# Patient Record
Sex: MALE | Race: BLACK OR AFRICAN AMERICAN | Employment: FULL TIME | URBAN - METROPOLITAN AREA
[De-identification: names, ages, dates, MRNs, and addresses within clinical notes are randomized per-mention and may not be internally consistent; named-entity substitution may affect disease eponyms.]

---

## 2019-03-16 ENCOUNTER — HOSPITAL ENCOUNTER (EMERGENCY)
Age: 49
Discharge: HOME | End: 2019-03-16
Attending: EMERGENCY MEDICINE
Payer: COMMERCIAL

## 2019-03-16 VITALS
HEART RATE: 91 BPM | WEIGHT: 166.56 LBS | BODY MASS INDEX: 26.77 KG/M2 | TEMPERATURE: 98.1 F | HEIGHT: 66 IN | RESPIRATION RATE: 20 BRPM | DIASTOLIC BLOOD PRESSURE: 70 MMHG | SYSTOLIC BLOOD PRESSURE: 138 MMHG | OXYGEN SATURATION: 96 %

## 2019-03-16 DIAGNOSIS — H60.391 INFECTIVE OTITIS EXTERNA OF RIGHT EAR: Primary | ICD-10-CM

## 2019-03-16 DIAGNOSIS — L02.91 ABSCESS: ICD-10-CM

## 2019-03-16 PROCEDURE — 99282 EMERGENCY DEPT VISIT SF MDM: CPT

## 2019-03-16 PROCEDURE — 6370000000 HC RX 637 (ALT 250 FOR IP): Performed by: EMERGENCY MEDICINE

## 2019-03-16 RX ORDER — ESOMEPRAZOLE MAGNESIUM 40 MG/1
40 FOR SUSPENSION ORAL 2 TIMES DAILY
COMMUNITY
End: 2020-01-08

## 2019-03-16 RX ORDER — CIPROFLOXACIN AND DEXAMETHASONE 3; 1 MG/ML; MG/ML
4 SUSPENSION/ DROPS AURICULAR (OTIC) ONCE
Status: DISCONTINUED | OUTPATIENT
Start: 2019-03-16 | End: 2019-03-16 | Stop reason: CLARIF

## 2019-03-16 RX ORDER — ESOMEPRAZOLE MAGNESIUM 40 MG/1
40 CAPSULE, DELAYED RELEASE ORAL
Qty: 30 CAPSULE | Refills: 0 | Status: SHIPPED | OUTPATIENT
Start: 2019-03-16 | End: 2020-01-27

## 2019-03-16 RX ORDER — OXYCODONE HYDROCHLORIDE AND ACETAMINOPHEN 5; 325 MG/1; MG/1
1 TABLET ORAL EVERY 4 HOURS PRN
Qty: 20 TABLET | Refills: 0 | Status: SHIPPED | OUTPATIENT
Start: 2019-03-16 | End: 2019-03-21

## 2019-03-16 RX ORDER — CIPROFLOXACIN AND DEXAMETHASONE 3; 1 MG/ML; MG/ML
4 SUSPENSION/ DROPS AURICULAR (OTIC) 2 TIMES DAILY
Qty: 1 BOTTLE | Refills: 0 | Status: SHIPPED | OUTPATIENT
Start: 2019-03-16 | End: 2019-03-23

## 2019-03-16 RX ORDER — AMOXICILLIN AND CLAVULANATE POTASSIUM 875; 125 MG/1; MG/1
1 TABLET, FILM COATED ORAL ONCE
Status: COMPLETED | OUTPATIENT
Start: 2019-03-16 | End: 2019-03-16

## 2019-03-16 RX ORDER — OXYCODONE HYDROCHLORIDE AND ACETAMINOPHEN 5; 325 MG/1; MG/1
2 TABLET ORAL ONCE
Status: COMPLETED | OUTPATIENT
Start: 2019-03-16 | End: 2019-03-16

## 2019-03-16 RX ORDER — CIPROFLOXACIN HYDROCHLORIDE 3.5 MG/ML
4 SOLUTION/ DROPS TOPICAL ONCE
Status: COMPLETED | OUTPATIENT
Start: 2019-03-16 | End: 2019-03-16

## 2019-03-16 RX ORDER — DEXAMETHASONE SODIUM PHOSPHATE 1 MG/ML
4 SOLUTION/ DROPS OPHTHALMIC ONCE
Status: COMPLETED | OUTPATIENT
Start: 2019-03-16 | End: 2019-03-16

## 2019-03-16 RX ORDER — CIPROFLOXACIN 500 MG/1
750 TABLET, FILM COATED ORAL 2 TIMES DAILY
Qty: 14 TABLET | Refills: 0 | Status: SHIPPED | OUTPATIENT
Start: 2019-03-16 | End: 2019-03-23

## 2019-03-16 RX ORDER — AMOXICILLIN AND CLAVULANATE POTASSIUM 875; 125 MG/1; MG/1
1 TABLET, FILM COATED ORAL 2 TIMES DAILY
Qty: 20 TABLET | Refills: 0 | Status: SHIPPED | OUTPATIENT
Start: 2019-03-16 | End: 2019-03-26

## 2019-03-16 RX ORDER — ONDANSETRON 4 MG/1
4 TABLET, ORALLY DISINTEGRATING ORAL ONCE
Status: COMPLETED | OUTPATIENT
Start: 2019-03-16 | End: 2019-03-16

## 2019-03-16 RX ADMIN — CIPROFLOXACIN HYDROCHLORIDE 4 DROP: 3 SOLUTION/ DROPS OPHTHALMIC at 06:39

## 2019-03-16 RX ADMIN — ONDANSETRON 4 MG: 4 TABLET, ORALLY DISINTEGRATING ORAL at 06:40

## 2019-03-16 RX ADMIN — OXYCODONE AND ACETAMINOPHEN 2 TABLET: 5; 325 TABLET ORAL at 06:37

## 2019-03-16 RX ADMIN — DEXAMETHASONE SODIUM PHOSPHATE 4 DROP: 1 SOLUTION/ DROPS OPHTHALMIC at 06:41

## 2019-03-16 RX ADMIN — AMOXICILLIN AND CLAVULANATE POTASSIUM 1 TABLET: 875; 125 TABLET, FILM COATED ORAL at 06:37

## 2019-03-16 ASSESSMENT — PAIN SCALES - GENERAL: PAINLEVEL_OUTOF10: 8

## 2020-01-08 ENCOUNTER — HOSPITAL ENCOUNTER (EMERGENCY)
Age: 50
Discharge: HOME | End: 2020-01-08
Attending: EMERGENCY MEDICINE
Payer: COMMERCIAL

## 2020-01-08 VITALS
WEIGHT: 164.2 LBS | HEIGHT: 66 IN | BODY MASS INDEX: 26.39 KG/M2 | RESPIRATION RATE: 16 BRPM | DIASTOLIC BLOOD PRESSURE: 88 MMHG | SYSTOLIC BLOOD PRESSURE: 166 MMHG | TEMPERATURE: 98.1 F | HEART RATE: 84 BPM | OXYGEN SATURATION: 100 %

## 2020-01-08 LAB
DIRECT EXAM: NORMAL
Lab: NORMAL
SPECIMEN DESCRIPTION: NORMAL

## 2020-01-08 PROCEDURE — 99283 EMERGENCY DEPT VISIT LOW MDM: CPT

## 2020-01-08 PROCEDURE — 87804 INFLUENZA ASSAY W/OPTIC: CPT

## 2020-01-08 RX ORDER — FAMOTIDINE 20 MG/1
20 TABLET, FILM COATED ORAL 2 TIMES DAILY
Qty: 20 TABLET | Refills: 0 | Status: SHIPPED | OUTPATIENT
Start: 2020-01-08 | End: 2020-01-27

## 2020-01-08 RX ORDER — CETIRIZINE HYDROCHLORIDE 10 MG/1
10 TABLET ORAL DAILY
Qty: 14 TABLET | Refills: 0 | Status: SHIPPED | OUTPATIENT
Start: 2020-01-08 | End: 2020-01-22

## 2020-01-08 RX ORDER — FLUTICASONE PROPIONATE 50 MCG
1 SPRAY, SUSPENSION (ML) NASAL DAILY
Qty: 1 BOTTLE | Refills: 0 | Status: SHIPPED | OUTPATIENT
Start: 2020-01-08 | End: 2020-01-27

## 2020-01-08 RX ORDER — BENZONATATE 100 MG/1
100-200 CAPSULE ORAL 3 TIMES DAILY PRN
Qty: 60 CAPSULE | Refills: 0 | Status: SHIPPED | OUTPATIENT
Start: 2020-01-08 | End: 2020-01-15

## 2020-01-08 ASSESSMENT — ENCOUNTER SYMPTOMS
SHORTNESS OF BREATH: 0
VOMITING: 0
COLOR CHANGE: 0
NAUSEA: 0
DIARRHEA: 0
SORE THROAT: 0
ABDOMINAL PAIN: 0
SINUS PRESSURE: 1
RHINORRHEA: 1
COUGH: 0

## 2020-01-08 ASSESSMENT — PAIN SCALES - GENERAL: PAINLEVEL_OUTOF10: 10

## 2020-01-08 NOTE — ED NOTES
PT to ED with heart burn and ear ache with sinus congestion respirations are non labored and radial pulses are strong bilat.      Sae Thompson RN  01/08/20 4608

## 2020-01-08 NOTE — DISCHARGE INSTR - COC
the diagnosis listed and that he requires {Admit to Appropriate Level of Care:81870} for {GREATER/LESS:962390359} 30 days.      Update Admission H&P: {CHP DME Changes in [facility]:298705943}    PHYSICIAN SIGNATURE:  {Esignature:[id number]}

## 2020-01-08 NOTE — ED PROVIDER NOTES
Respiratory: Negative for cough and shortness of breath. Cardiovascular: Negative for chest pain and palpitations. Gastrointestinal: Negative for abdominal pain, diarrhea, nausea and vomiting. Musculoskeletal: Negative for arthralgias, myalgias, neck pain and neck stiffness. Skin: Negative for color change and rash. Neurological: Negative for dizziness, weakness, light-headedness and headaches. Except as noted above the remainder of the review of systems was reviewed and negative. PHYSICAL EXAM    (up to 7 for level 4, 8 or more for level 5)     ED Triage Vitals   BP Temp Temp src Pulse Resp SpO2 Height Weight   01/08/20 1337 01/08/20 1337 -- 01/08/20 1337 01/08/20 1337 01/08/20 1337 01/08/20 1337 01/08/20 1337   (!) 176/109 98.1 °F (36.7 °C)  91 18 97 % 5' 6\" (1.676 m) 164 lb 3.2 oz (74.5 kg)     Physical Exam  Vitals signs reviewed. Constitutional:       General: He is not in acute distress. Appearance: He is well-developed. He is not diaphoretic. HENT:      Right Ear: Tympanic membrane, ear canal and external ear normal.      Left Ear: Tympanic membrane, ear canal and external ear normal.      Nose: Congestion present. No rhinorrhea. Mouth/Throat:      Mouth: Mucous membranes are moist.      Pharynx: Oropharynx is clear. Eyes:      Conjunctiva/sclera: Conjunctivae normal.   Neck:      Musculoskeletal: Neck supple. No neck rigidity. Cardiovascular:      Rate and Rhythm: Normal rate and regular rhythm. Heart sounds: Normal heart sounds. Pulmonary:      Effort: Pulmonary effort is normal. No respiratory distress. Breath sounds: Normal breath sounds. No wheezing or rales. Abdominal:      General: There is no distension. Palpations: Abdomen is soft. Tenderness: There is no tenderness. Skin:     General: Skin is warm and dry. Findings: No rash. Neurological:      Mental Status: He is alert and oriented to person, place, and time.    Psychiatric:

## 2020-01-27 ENCOUNTER — HOSPITAL ENCOUNTER (EMERGENCY)
Age: 50
Discharge: HOME | End: 2020-01-27
Attending: EMERGENCY MEDICINE
Payer: COMMERCIAL

## 2020-01-27 ENCOUNTER — APPOINTMENT (OUTPATIENT)
Dept: GENERAL RADIOLOGY | Age: 50
End: 2020-01-27
Payer: COMMERCIAL

## 2020-01-27 VITALS
OXYGEN SATURATION: 95 % | HEART RATE: 100 BPM | SYSTOLIC BLOOD PRESSURE: 152 MMHG | BODY MASS INDEX: 26.57 KG/M2 | RESPIRATION RATE: 14 BRPM | HEIGHT: 66 IN | WEIGHT: 165.31 LBS | TEMPERATURE: 98.1 F | DIASTOLIC BLOOD PRESSURE: 87 MMHG

## 2020-01-27 PROCEDURE — 73610 X-RAY EXAM OF ANKLE: CPT

## 2020-01-27 PROCEDURE — 29515 APPLICATION SHORT LEG SPLINT: CPT

## 2020-01-27 PROCEDURE — 99284 EMERGENCY DEPT VISIT MOD MDM: CPT

## 2020-01-27 RX ORDER — HYDROCODONE BITARTRATE AND ACETAMINOPHEN 5; 325 MG/1; MG/1
1 TABLET ORAL EVERY 8 HOURS PRN
Qty: 20 TABLET | Refills: 0 | Status: SHIPPED | OUTPATIENT
Start: 2020-01-27 | End: 2020-02-03

## 2020-01-27 RX ORDER — IBUPROFEN 800 MG/1
800 TABLET ORAL EVERY 8 HOURS PRN
Qty: 15 TABLET | Refills: 0 | Status: SHIPPED | OUTPATIENT
Start: 2020-01-27

## 2020-01-27 ASSESSMENT — ENCOUNTER SYMPTOMS: COLOR CHANGE: 0

## 2020-01-27 ASSESSMENT — PAIN SCALES - GENERAL: PAINLEVEL_OUTOF10: 10

## 2020-01-27 ASSESSMENT — PAIN DESCRIPTION - PAIN TYPE: TYPE: ACUTE PAIN

## 2020-01-27 NOTE — ED NOTES
Pt presents to ED via private auto with c/o ankle pain s/p fall. Pt states that he fell last night and injured his right ankle. Pt has swelling noted to the lateral aspect of his right ankle. Pt ambulated to room 7 with slight difficulty d/t pain, pt refused wheelchair. Ice pack applied to pt's right ankle. Pt's bilat pedal pulses are strong and palpable. Pt's mucous membranes are pink and moist, pt's skin is warm and dry. Pt's respirations are even and non-labored, no distress noted at this time, will continue to monitor pt.       Florida Jones RN  01/27/20 3571

## 2020-01-27 NOTE — LETTER
[facility] ED  [address] [address]  Phone: [phone]             January 27, 2020    Patient: Elvia Montoya   YOB: 1970   Date of Visit: 1/27/2020       To Whom It May Concern:    Elvia Montoya was seen and treated in our emergency department on 1/27/2020. Please excuse him from work 1- through 1-.     Sincerely,             Signature:__________________________________

## 2020-01-28 ENCOUNTER — OFFICE VISIT (OUTPATIENT)
Dept: PODIATRY | Age: 50
End: 2020-01-28
Payer: COMMERCIAL

## 2020-01-28 VITALS — BODY MASS INDEX: 26.52 KG/M2 | WEIGHT: 165 LBS | HEIGHT: 66 IN

## 2020-01-28 PROCEDURE — 99203 OFFICE O/P NEW LOW 30 MIN: CPT | Performed by: PODIATRIST

## 2020-01-28 NOTE — LETTER
[facility]  [address] [facility] [address].  Phone: [phone]    Merissa Carver        January 28, 2020     Patient: Joie Rodriguez   YOB: 1970   Date of Visit: 1/28/2020       To Whom it May Concern:    Joie Rodriguez was seen in my clinic on 1/28/2020. He may return to work on Tuesday February 11 2020. Patient is unable to work due to a left ankle fracture. He has to wear the cam boot, that was given to him in the office, at all times as well as using the crutches to remain non-weight bearing on his left ankle. Patient will follow up in my office in 2 weeks for a follow up appointment to re-examine his left ankle injury. If I feel he has not healed at that time, I will re-issue another letter for him to remain off of work. If you have any questions or concerns, please don't hesitate to call.     Sincerely,         Live Miranda DPM

## 2020-01-28 NOTE — PROGRESS NOTES
[address] [facility] [address] [facility] [address].  Dept: [phone]    NEW PATIENT PROGRESS NOTE  Date of patient's visit: 1/28/2020  Patient's Name:  Isaak Beck YOB: 1970            No care team member to display        Chief Complaint   Patient presents with    New Patient    Ankle Injury     left         HPI:   Isaak Beck is a 52 y.o. male who presents to the office today complaining of left ankle injury. Symptoms began 2 day(s) ago. Patient relates pain is Present. Pain is rated 10 out of 10 and is described as constant. Treatments prior to today's visit include: visit to [facility] emergency room/x-rays . Currently denies F/C/N/V. Pt's primary care physician is No primary care provider on file. last seen patient states he does not have a pcp at this time. patient states he tried to step up in his truck and lost his balance and fell back on his left ankle. No Known Allergies    No past medical history on file. Prior to Admission medications    Medication Sig Start Date End Date Taking? Authorizing Provider   ibuprofen (ADVIL;MOTRIN) 800 MG tablet Take 1 tablet by mouth every 8 hours as needed for Pain or Fever 1/27/20  Yes JACQUELINE Iyer CNP   HYDROcodone-acetaminophen (NORCO) 5-325 MG per tablet Take 1 tablet by mouth every 8 hours as needed for Pain (WARNING:  May cause drowsiness.  May impair ability to operate vehicles or machinery.  Do not use in combination with alcohol.) for up to 7 days. 1/27/20 2/3/20 Yes JACQUELINE Iyer CNP       No past surgical history on file. No family history on file.     Social History     Tobacco Use    Smoking status: Current Some Day Smoker    Smokeless tobacco: Current User   Substance Use Topics    Alcohol use: Yes       Review of Systems    Review of Systems:   History obtained from chart review and the patient  General ROS: negative for - chills, fatigue, fever, night sweats or weight gain  Constitutional: Negative for chills, diaphoresis, fatigue, fever and unexpected weight change. Musculoskeletal: Positive for arthralgias, gait problem and joint swelling. Neurological ROS: negative for - behavioral changes, confusion, headaches or seizures. Negative for weakness and numbness. Dermatological ROS: negative for - mole changes, rash  Cardiovascular: Negative for leg swelling. Gastrointestinal: Negative for constipation, diarrhea, nausea and vomiting. Lower Extremity Physical Examination:   Vitals: There were no vitals filed for this visit. General: AAO x 3 in NAD. Dermatologic Exam:  Skin lesion/ulceration Absent . Skin No rashes or nodules noted. .       Musculoskeletal:     1st MPJ ROM decreased, Bilateral.  Muscle strength 5/5, Bilateral.  Pain present upon palpation of left ankle along ATFL and CFL. +POP along peroneal tendons. Medial longitudinal arch, Bilateral WNL. Ankle ROM WNL,Bilateral.    Dorsally contracted digits absent digits 1-5 Bilateral.     Vascular: DP and PT pulses palpable 2/4, Bilateral.  CFT <3 seconds, Bilateral.  Hair growth present to the level of the digits, Bilateral.  Edema noted to lateral left ankle. Varicosities absent, Bilateral. Erythema absent, Bilateral    Neurological: Sensation intact to light touch to level of digits, Bilateral.  Protective sensation intact 10/10 sites via 5.07/10g [place]-Rebecca Monofilament, Bilateral.  negative Tinel's, Bilateral.  negative Valleix sign, Bilateral.      Integument: Warm, dry, supple, Bilateral.  Open lesion absent, Bilateral.  Interdigital maceration absent to web spaces 1-4, Bilateral.  Nails are normal in length, thickness and color 1-5 bilateral.  Fissures absent, Bilateral.       Asessment: Patient is a 52 y.o. male with:    Diagnosis Orders   1. Sprain of right ankle, unspecified ligament, initial encounter     2. Edema of lower extremity     3.  Difficulty walking Plan: Patient examined and evaluated. Current condition and treatment options discussed in detail. Discussed conservative and surgical options with the patient. Advised pt to immobilize left ankle using CAM boot. Reviewed xrays with patient, without signs of acute fractures. Pt to ice and elevate at home. May take NSAID as tolerated. Verbal and written instructions given to patient. Contact office with any questions/problems/concerns. RTC in 2week(s).     1/28/2020    Electronically signed by Rosana Little DPM on 1/28/2020 at 3:21 PM  1/28/2020

## 2020-02-10 ENCOUNTER — OFFICE VISIT (OUTPATIENT)
Dept: PODIATRY | Age: 50
End: 2020-02-10
Payer: COMMERCIAL

## 2020-02-10 VITALS — BODY MASS INDEX: 26.52 KG/M2 | WEIGHT: 165 LBS | RESPIRATION RATE: 16 BRPM | HEIGHT: 66 IN

## 2020-02-10 PROCEDURE — 99213 OFFICE O/P EST LOW 20 MIN: CPT | Performed by: PODIATRIST

## 2020-02-10 NOTE — PROGRESS NOTES
[facility] PHYSICIANS  [facility]  [address] [place] [address]  Dept: [phone]  Dept Fax: [phone]    RETURN PATIENT PROGRESS NOTE  Date of patient's visit: 2/10/2020  Patient's Name:  Jared Fischer YOB: 1970            No care team member to display       Jared Fischer 52 y.o. male that presents for follow-up of   Chief Complaint   Patient presents with    Ankle Pain     right ankle     Symptoms began 2 week(s) ago and are increased . Patient relates pain is Present. Pain is rated 8 out of 10 and is described as constant, severe. Treatments prior to today's visit include: CAM boot. Currently denies F/C/N/V. No Known Allergies    No past medical history on file. Prior to Admission medications    Medication Sig Start Date End Date Taking? Authorizing Provider   ibuprofen (ADVIL;MOTRIN) 800 MG tablet Take 1 tablet by mouth every 8 hours as needed for Pain or Fever 1/27/20  Yes Donovan Diaz, APRN - CNP       Review of Systems    Review of Systems:  History obtained from chart review and the patient  General ROS: negative for - chills, fatigue, fever, night sweats or weight gain  Constitutional: Negative for chills, diaphoresis, fatigue, fever and unexpected weight change. Musculoskeletal: Positive for arthralgias, gait problem and joint swelling. Neurological ROS: negative for - behavioral changes, confusion, headaches or seizures. Negative for weakness and numbness. Dermatological ROS: negative for - mole changes, rash  Cardiovascular: Negative for leg swelling. Gastrointestinal: Negative for constipation, diarrhea, nausea and vomiting. Lower Extremity Physical Examination:     Vitals:   Vitals:    02/10/20 1452   Resp: 16     General: AAO x 3 in NAD. Dermatologic Exam:  Skin lesion/ulceration Absent . Skin No rashes or nodules noted. .       Musculoskeletal:     1st MPJ ROM decreased, Bilateral.  Muscle strength 5/5, Bilateral.  Pain present upon palpation of right ankle along CFL. Medial longitudinal arch, Bilateral WNL. Ankle ROM guarded, right. Dorsally contracted digits absent digits 1-5 Bilateral.     Vascular: DP and PT pulses palpable 2/4, Bilateral.  CFT <3 seconds, Bilateral.  Hair growth present to the level of the digits, Bilateral.  Edema absent, Bilateral.  Varicosities absent, Bilateral. Erythema absent, Bilateral    Neurological: Sensation intact to light touch to level of digits, Bilateral.  Protective sensation intact 10/10 sites via 5.07/10g Dahlgren-Rebecca Monofilament, Bilateral.  negative Tinel's, Bilateral.  negative Valleix sign, Bilateral.      Integument: Warm, dry, supple, Bilateral.  Open lesion absent, Bilateral.  Interdigital maceration absent to web spaces 1-4, Bilateral.  Nails are normal in length, thickness and color 1-5 bilateral.  Fissures absent, Bilateral.       Asessment: Patient is a 52 y.o. male with:    Diagnosis Orders   1. Sprain of right ankle, unspecified ligament, initial encounter     2. Difficulty walking         Plan: Patient examined and evaluated. Current condition and treatment options discussed in detail. Advised pt to remain in CAM boot to immobilize right ankle. At this time we recommend pt limit weightbearing for 2 weeks for adequate healing. We do not recommend surgery at this time. Verbal and written instructions given to patient. Contact office with any questions/problems/concerns. No orders of the defined types were placed in this encounter. No orders of the defined types were placed in this encounter. RTC in 2week(s).     2/10/2020      Electronically signed by Jan Bustamante DPM on 2/10/2020 at 2:53 PM  2/10/2020

## 2020-02-19 ENCOUNTER — OFFICE VISIT (OUTPATIENT)
Dept: PODIATRY | Age: 50
End: 2020-02-19
Payer: COMMERCIAL

## 2020-02-19 VITALS — WEIGHT: 165 LBS | BODY MASS INDEX: 26.52 KG/M2 | RESPIRATION RATE: 16 BRPM | HEIGHT: 66 IN

## 2020-02-19 PROCEDURE — 99213 OFFICE O/P EST LOW 20 MIN: CPT | Performed by: PODIATRIST

## 2020-02-19 NOTE — PROGRESS NOTES
[facility] PHYSICIANS  [facility]  [address] [place] [address]  Dept: [phone]  Dept Fax: [phone]    RETURN PATIENT PROGRESS NOTE  Date of patient's visit: 2/19/2020  Patient's Name:  Sincere Oreilly YOB: 1970            No care team member to display       Sincere Oreilly 52 y.o. male that presents for follow-up of right ankle pain  Chief Complaint   Patient presents with    Ankle Pain     right     Pt's primary care physician is No primary care provider on file. last seen  Symptoms began 6 week(s) ago and are decreased . Patient relates pain is Present. Pain is rated 7 out of 10 and is described as intermittent. Treatments prior to today's visit include: wearing CAM boot. Currently denies F/C/N/V. No Known Allergies    No past medical history on file. Prior to Admission medications    Medication Sig Start Date End Date Taking? Authorizing Provider   ibuprofen (ADVIL;MOTRIN) 800 MG tablet Take 1 tablet by mouth every 8 hours as needed for Pain or Fever 1/27/20   JACQUELINE Sexton - CNP       Review of Systems    Review of Systems:  History obtained from chart review and the patient  General ROS: negative for - chills, fatigue, fever, night sweats or weight gain  Constitutional: Negative for chills, diaphoresis, fatigue, fever and unexpected weight change. Musculoskeletal: Positive for arthralgias, gait problem and joint swelling. Neurological ROS: negative for - behavioral changes, confusion, headaches or seizures. Negative for weakness and numbness. Dermatological ROS: negative for - mole changes, rash  Cardiovascular: Negative for leg swelling. Gastrointestinal: Negative for constipation, diarrhea, nausea and vomiting. Lower Extremity Physical Examination:     Vitals:   Vitals:    02/19/20 1315   Resp: 16     General: AAO x 3 in NAD. Dermatologic Exam:  Skin lesion/ulceration Absent . Skin No rashes or nodules noted. Kacie Herron Musculoskeletal:     1st MPJ ROM decreased, Bilateral.  Muscle strength 5/5, Bilateral.  Pain present upon palpation of right ankle. Medial longitudinal arch, Bilateral WNL. Ankle ROM guarded, right. Dorsally contracted digits absent digits 1-5 Bilateral.     Vascular: DP and PT pulses palpable 2/4, Bilateral.  CFT <3 seconds, Bilateral.  Hair growth present to the level of the digits, Bilateral.  Edema absent, Bilateral.  Varicosities absent, Bilateral. Erythema absent, Bilateral    Neurological: Sensation intact to light touch to level of digits, Bilateral.  Protective sensation intact 10/10 sites via 5.07/10g Kane-Rebecca Monofilament, Bilateral.  negative Tinel's, Bilateral.  negative Valleix sign, Bilateral.      Integument: Warm, dry, supple, Bilateral.  Open lesion absent, Bilateral.  Interdigital maceration absent to web spaces 1-4, Bilateral.  Nails are normal in length, thickness and color 1-5 bilateral.  Fissures absent, Bilateral.       Asessment: Patient is a 52 y.o. male with:    Diagnosis Orders   1. Sprain of right ankle, unspecified ligament, initial encounter  Ambulatory referral to Physical Therapy   2. Difficulty walking  Ambulatory referral to Physical Therapy       Plan: Patient examined and evaluated. Current condition and treatment options discussed in detail. Advised pt to gradually transition to normal shoes as tolerated. We do not advised staying in CAM boot too long as it can cause increased weakness to RLE. Recommend physical therapy at this time to improve ROM. Due to absent fracture on xrays, pt may return to weightbearing as tolerated. Verbal and written instructions given to patient. Contact office with any questions/problems/concerns. No orders of the defined types were placed in this encounter. No orders of the defined types were placed in this encounter. RTC in 2month(s).     2/19/2020      Electronically signed by Iban Martínez DPM on 2/19/2020 at 1:54 PM  2/19/2020

## 2020-02-19 NOTE — LETTER
[facility]  [address] [facility] [address]  Phone: [phone]  Fax: [phone]    Merissa Palmer        February 19, 2020     Patient: Mac Mar   YOB: 1970   Date of Visit: 2/19/2020       To Whom It May Concern: It is my medical opinion that Mac Mar should remain out of work until 03/03/2020 and will be starting physical therapy asap which could take up to 6-8 weeks. If you have any questions or concerns, please don't hesitate to call.     Sincerely,        Kevin Flowers DPM

## 2020-03-09 ENCOUNTER — OFFICE VISIT (OUTPATIENT)
Dept: PODIATRY | Age: 50
End: 2020-03-09
Payer: COMMERCIAL

## 2020-03-09 VITALS — RESPIRATION RATE: 16 BRPM | WEIGHT: 165 LBS | BODY MASS INDEX: 26.52 KG/M2 | HEIGHT: 66 IN

## 2020-03-09 PROCEDURE — 99213 OFFICE O/P EST LOW 20 MIN: CPT | Performed by: PODIATRIST

## 2020-03-09 NOTE — PROGRESS NOTES
[facility] PHYSICIANS  [facility]  [address] [place] [address]  Dept: [phone]  Dept Fax: [phone]    RETURN PATIENT PROGRESS NOTE  Date of patient's visit: 3/9/2020  Patient's Name:  Kenn Sims YOB: 1970            No care team member to display       Kenn Sims 52 y.o. male that presents for follow-up of right ankle pain  Chief Complaint   Patient presents with    Ankle Pain     right     Pt's primary care physician is No primary care provider on file. last seen  Symptoms began 8 week(s) ago and are decreased . Patient relates pain is Present. Pain is rated 5 out of 10 and is described as intermittent. Treatments prior to today's visit include: Pt was initially seen  At [facility] were x-rays were obtained and no fracture was seen. Pt was given a CAM boot  Currently denies F/C/N/V. No Known Allergies    No past medical history on file. Prior to Admission medications    Medication Sig Start Date End Date Taking? Authorizing Provider   ibuprofen (ADVIL;MOTRIN) 800 MG tablet Take 1 tablet by mouth every 8 hours as needed for Pain or Fever 1/27/20   JACQUELINE Vázquez - CNP       Review of Systems    Review of Systems:  History obtained from chart review and the patient  General ROS: negative for - chills, fatigue, fever, night sweats or weight gain  Constitutional: Negative for chills, diaphoresis, fatigue, fever and unexpected weight change. Musculoskeletal: Positive for arthralgias, gait problem and joint swelling. Neurological ROS: negative for - behavioral changes, confusion, headaches or seizures. Negative for weakness and numbness. Dermatological ROS: negative for - mole changes, rash  Cardiovascular: Negative for leg swelling. Gastrointestinal: Negative for constipation, diarrhea, nausea and vomiting.          Lower Extremity Physical Examination:     Vitals:   Vitals:    03/09/20 1334   Resp: 16     General: AAO x 3 in PM  3/9/2020

## 2020-03-10 ENCOUNTER — HOSPITAL ENCOUNTER (OUTPATIENT)
Dept: PHYSICAL THERAPY | Age: 50
Setting detail: THERAPIES SERIES
Discharge: HOME | End: 2020-03-10
Payer: COMMERCIAL

## 2020-03-10 PROCEDURE — 97110 THERAPEUTIC EXERCISES: CPT

## 2020-03-10 PROCEDURE — 97162 PT EVAL MOD COMPLEX 30 MIN: CPT

## 2020-03-10 PROCEDURE — 97016 VASOPNEUMATIC DEVICE THERAPY: CPT

## 2020-03-10 NOTE — CONSULTS
artifact.  [] MRI:  [] Other:    Medications: [x] Refer to full medical record [] None [] Other:  Allergies:      [x] Refer to full medical record [] None [] Other:    Function:  Hand Dominance  [] Right  [x] Left  In what type of home []  One story   [x] Two story   [] Split level   Number of stairs to enter 5   With handrail on the [x]  Right to enter   [] Left to enter   Bathroom has a [x]  Tub only -- struggles to get out of the tub, putting pressure on the ankle hurts  [] Tub/shower combo   [] Walk in shower    []  Grab bars   Washing machine is on []  Main level   [] Second level   [x] Basement   Employer [employer]   Job Status []  Normal duty   [] Light duty   [] Off due to condition    []  Retired   [] Not employed   [] Disability  [] Other:  [x]  Return to work:  Was cleared to return to work as of 3/9/20   Work activities/duties Putting fay on [employer]     ADL/IADL Previous level of function Current level of function Who currently assists the patient with task   Bathing  [x] Independent  [] Assist [x] Independent  [] Assist    Dress/grooming [x] Independent  [] Assist [x] Independent  [] Assist    Transfer/mobility [x] Independent  [] Assist [x] Independent  [] Assist    Feeding [x] Independent  [] Assist [x] Independent  [] Assist    Toileting [x] Independent  [] Assist [x] Independent  [] Assist    Driving [x] Independent  [] Assist [x] Independent  [] Assist    Housekeeping [x] Independent  [] Assist [x] Independent  [] Assist    Grocery shop/meal prep [x] Independent  [] Assist [x] Independent  [] Assist      Gait Prior level of function Current level of function    [x] Independent  [] Assist [x] Independent  [] Assist   Device: [x] Independent [x] Independent    [] Straight Cane [] Quad cane [] Straight Cane [] Quad cane    [] Standard walker [] Rolling walker   [] 4 wheeled walker [] Standard walker [] Rolling walker   [] 4 wheeled walker    [] Wheelchair [] Wheelchair     Pain:  [x] Yes  [] No Location: Right ankle   Pain Rating: (0-10 scale) 9/10  Pain altered Tx:  [] Yes  [x] No  Action:    Symptoms:  [] Improving [] Worsening [x] Same  Better:  [] AM    [] PM    [x] Sit, but not too long    [] Rise/Sit    []Stand    [] Walk    [] Lying    [] Other:  Worse: [] AM    [] PM    [] Sit    [] Rise/Sit    []Stand    [] Walk    [] Lying    [] Bend                      [] Valsalva    [x] Other: a lot  Sleep: [] OK    [x] Disturbed    Objective:    ROM  ° A/P STRENGTH    Left Right Left Right   Ankle DF 8 AROM Lack 5 to AROM 3 5 3+   PF 58 35-38 5 3+   INV 52 24-34 5 3+   EVER 20 10 5 3+   Hamstring Lack 20 Lack 12     DF with knee flexion 3 right, 11 left   Non- effected Initial, in cm Re-eval,in cm Comments   Supramalleolar 19.8 19.9     Arch 23.7 22.8     Figure of 8 52.0 51.5       OBSERVATION No Deficit Deficit Not Tested Comments   Posture       Forward Head [x] [] []    Rounded Shoulders [] [x] []    Kyphosis [x] [] []    Lordosis [x] [] []    Genu Valgus [x] [] []    Genu Varus [x] [] []    Genu Recurvatum [x] [] []    Pronation [x] [] []    Supination [x] [] []    Leg Length Discrp [x] [] []    Slumped Sitting [] [x] []    Palpation [] [x] [] Tender entire ankle   Sensation [] [x] [] Toes and top of foot get numb   Edema [] [x] [] See chart above   Neurological [x] [] []    Gait [] [] [] Analysis: Did not analyze at eval due to patient only wearing CAM boot     FUNCTION Normal Difficult Unable   Sitting [] [x] []   Standing [] [x] []   Ambulation [] [x] []   Groom/Dress [] [x] []   Lift/Carry [] [x] []   Stairs [] [x] []   Bending [] [x] []   Squat [] [x] []   Kneel [] [x] []     Functional Test: FAAM Score: 69% functionally impaired     Comments:    Assessment:  Patient would benefit from skilled physical therapy services in order to: improve gait, improve range of motion, improve strength, decrease risk of falling, decrease edema, improve ability to return to work.     Patient insisted that therapist tell physician that he is not ready to return to work. Patient reports he has not been weaning out of his boot, as his ankle hurts more when his boot is off9. Education provided to patient that it is because he continues to wear the boot that his pain continues to be elevated. He states that pain continues to limit him and he cannot stand to have shoes on for extended time. He states he feels like he needs the compression of his boot to walk. Discussed continued icing of ankle, and that it will swell with activity for months, and that it is normal.  Due to patients lack of progression out of the boot at home, his recovery has been delayed. Educated patient that he needs to bring his shoes to next Rx (either tennis shoe or steel toed work boots). Will work with patient three times per week to expedite his return to work. Problems:    [x] ? Pain:  [x] ? ROM:  [x] ? Strength:  [x] ? Function:  [] Other:       STG: (to be met in 9 treatments)  1. ? Pain: Right ankle pain improve to 6/10 with standing and walking with tennis shoe on  2. ? ROM: Right ankle AROM improve to 0 degrees dorsiflexion, 44 degrees plantarflexion, inversion 34 degrees, eversion 15 degrees  3. ? Strength: Grossly 4-/5 all planes  4. ? Function: Patient able to walk with work boot or tennis shoe on community distances  5. Patient to be independent with home exercise program as demonstrated by performance with correct form without cues. LTG: (to be met in 18 treatments)  1. Return to work full duty. 2. Right ankle pain improve to 2/10 with long distance walking  3. Right ankle AROM improve to 5 degrees dorsiflexion, 50 degrees plantarflexion, inversion 40 degrees  4. Patient able to complete 5 reps single leg heel raise without UE assist with boot/shoe on with right foot  5. Sleep to return to baseline  6. FAAM score of 25% functionally impaired or less                 Patient goals:  \"To get better and move forward with my life\"    Rehab Potential:  [x] Good  [] Fair  [] Poor   Suggested Professional Referral:  [x] No  [] Yes:  Barriers to Goal Achievement:  [] No  [x] Yes: Patient very hesitant to move right ankle; continues to insist that he cannot return to work as he cannot wear a steel toed boot yet. Domestic Concerns:  [x] No  [] Yes:    Pt. Education:  [x] Plans/Goals, Risks/Benefits discussed  [x] Home exercise program    Method of Education: [x] Verbal  [x] Demo  [x] Written -- see chart below  Comprehension of Education:  [] Verbalizes understanding. [] Demonstrates understanding. [x] Needs Review. [] Demonstrates/verbalizes understanding of HEP/Ed previously given. Treatment Plan:  [x] Therapeutic Exercise   29937  [] Iontophoresis: 4 mg/mL Dexamethasone Sodium Phosphate  mAmin  96956   [x] Therapeutic Activity  44549 [x] Vasopneumatic cold with compression  09299    [x] Gait Training   90553 [] Ultrasound   54420   [x] Neuromuscular Re-education  11460 [] Electrical Stimulation Unattended  54534   [x] Manual Therapy  76424 [x] Electrical Stimulation Attended  33868   [x] Instruction in HEP  [] Lumbar/Cervical Traction  01157   [] Aquatic Therapy   16373 [] Cold/hotpack    [] Massage   20572      [] Dry Needling, 1 or 2 muscles  67688   [] Biofeedback, first 15 minutes   67938  [] Biofeedback, additional 15 minutes   19056 [] Dry Needling, 3 or more muscles  80145     []  Medication allergies reviewed for use of    Dexamethasone Sodium Phosphate 4mg/ml     with iontophoresis treatments. Pt is not allergic. Frequency:  3 x/week for 18 visits    Todays Treatment:  Modalities: Vasopneumatic cold/compression after exercises with right foot on bolster; 15 minutes; low pressure, 42 degrees.   (Sanitary sock placed in cupboard by estim patches)  Precautions:  Exercises:  Exercise Reps/ Time Weight/ Level Comments   Seated towel scrunches 15 x  HEP   Seated DF 15 x  HEP   Seated PF 15 x  HEP   Stand sway forward/

## 2020-03-12 ENCOUNTER — HOSPITAL ENCOUNTER (OUTPATIENT)
Dept: PHYSICAL THERAPY | Age: 50
Setting detail: THERAPIES SERIES
Discharge: HOME | End: 2020-03-12
Payer: COMMERCIAL

## 2020-03-12 PROCEDURE — 97110 THERAPEUTIC EXERCISES: CPT

## 2020-03-13 ENCOUNTER — HOSPITAL ENCOUNTER (OUTPATIENT)
Dept: PHYSICAL THERAPY | Age: 50
Setting detail: THERAPIES SERIES
Discharge: HOME | End: 2020-03-13
Payer: COMMERCIAL

## 2020-03-13 PROCEDURE — 97110 THERAPEUTIC EXERCISES: CPT

## 2020-03-13 NOTE — FLOWSHEET NOTE
Exercise 38 3   []  Manual Therapy     []  Ther Activities     []  Aquatics     []  Vasocompression     []  Other     Total Treatment time 38 3       Assessment: [x] Progressing toward goals. [] No change. [x] Other: Pt accomplished all activities as listed above with noted increase in pain through out but pt completed all activities. Pt noted that the pain increase was a good pain like he was working. Added SLS, ankle ABC, 4 way ankle and marbles to increase lower extremity strength. Pt presented with good tolerance for progressions. Pt received HEP for 4 way ankle and lime resistance band. [x] Patient would continue to benefit from skilled physical therapy services in order to: improve gait, improve range of motion, improve strength, decrease risk of falling, decrease edema, improve ability to return to work. STG: (to be met in 9 treatments)  1. ? Pain: Right ankle pain improve to 6/10 with standing and walking with tennis shoe on  2. ? ROM: Right ankle AROM improve to 0 degrees dorsiflexion, 44 degrees plantarflexion, inversion 34 degrees, eversion 15 degrees  3. ? Strength: Grossly 4-/5 all planes  4. ? Function: Patient able to walk with work boot or tennis shoe on community distances  5. Patient to be independent with home exercise program as demonstrated by performance with correct form without cues.     LTG: (to be met in 18 treatments)  1. Return to work full duty. 2. Right ankle pain improve to 2/10 with long distance walking  3. Right ankle AROM improve to 5 degrees dorsiflexion, 50 degrees plantarflexion, inversion 40 degrees  4. Patient able to complete 5 reps single leg heel raise without UE assist with boot/shoe on with right foot  5. Sleep to return to baseline  6. FAAM score of 25% functionally impaired or less                 Patient goals: \"To get better and move forward with my life\"    Pt.  Education:  [x] Yes  [] No  [x] Reviewed Prior HEP/Ed  Method of Education: [x] Verbal [x] Demo  [] Written  Comprehension of Education:  [x] Verbalizes understanding. [x] Demonstrates understanding. [x] Needs review. [] Demonstrates/verbalizes HEP/Ed previously given. Plan: [x] Continue current frequency toward long and short term goals. [x] Specific Instructions for subsequent treatments: Improve right ankle ROM, strengthening, balance, gait. Progress from seated to standing. Progress to resistance bands. Patient was hesitant at evaluation to move right ankle. Will need encouragement to improve his range and strength.       Time In: 1402            Time Out: 8257    Electronically signed by:  Tunde Saucedo PTA

## 2020-03-16 ENCOUNTER — HOSPITAL ENCOUNTER (OUTPATIENT)
Dept: PHYSICAL THERAPY | Age: 50
Setting detail: THERAPIES SERIES
Discharge: HOME | End: 2020-03-16
Payer: COMMERCIAL

## 2020-03-16 PROCEDURE — 97110 THERAPEUTIC EXERCISES: CPT

## 2020-03-16 NOTE — FLOWSHEET NOTE
treatments)  1. Return to work full duty. 2. Right ankle pain improve to 2/10 with long distance walking  3. Right ankle AROM improve to 5 degrees dorsiflexion, 50 degrees plantarflexion, inversion 40 degrees  4. Patient able to complete 5 reps single leg heel raise without UE assist with boot/shoe on with right foot  5. Sleep to return to baseline  6. FAAM score of 25% functionally impaired or less                 Patient goals: \"To get better and move forward with my life\"    Pt. Education:  [x] Yes  [] No  [x] Reviewed Prior HEP/Ed  Method of Education: [x] Verbal  [x] Demo  [] Written  Comprehension of Education:  [x] Verbalizes understanding. [x] Demonstrates understanding. [x] Needs review. [] Demonstrates/verbalizes HEP/Ed previously given. Plan: [x] Continue current frequency toward long and short term goals. [x] Specific Instructions for subsequent treatments: Improve right ankle ROM, strengthening, balance, gait. Progress from seated to standing. Progress to resistance bands. Patient was hesitant at evaluation to move right ankle. Will need encouragement to improve his range and strength.       Time In: 1128            Time Out: 1218    Electronically signed by:  Cornel Woodward, PT

## 2020-03-20 ENCOUNTER — HOSPITAL ENCOUNTER (OUTPATIENT)
Dept: PHYSICAL THERAPY | Age: 50
Setting detail: THERAPIES SERIES
Discharge: HOME | End: 2020-03-20
Payer: COMMERCIAL

## 2020-03-20 NOTE — FLOWSHEET NOTE
[x] [facility]) [facility]  [address]    [phone]  F: [phone]   [] [address] [facility] [[email or web address] [address]  [facility]   [address]  P: [phone]  F: [phone]  [] [address]  [address] [facility]  P: [phone]  F: [phone]  [] [address]  [facility]   [address]   [place]: [phone]  F: [phone]   [] [place] [address] [facility] [address]  [place]: [phone]   F: [phone]     Physical Therapy Cancel/No Show note    Date: 3/20/2020  Patient: Mack Romano  : 1970  MRN: [id number]    Cancels/No Shows to date:     For today's appointment patient:    []  Cancelled    [] Rescheduled appointment    [x] No-show     Reason given by patient:    []  Patient ill    []  Conflicting appointment    [] No transportation      [] Conflict with work    [] No reason given    [] Weather related    [x] Other: Called patient about missed appointment this date.  Also notified patient PT cancelling all appointments secondary to COVID-19   Comments:        [] Next appointment was confirmed    Electronically signed by: Anthony Feliz PTA

## 2020-04-21 ENCOUNTER — HOSPITAL ENCOUNTER (OUTPATIENT)
Dept: PHYSICAL THERAPY | Age: 50
Setting detail: THERAPIES SERIES
Discharge: HOME | End: 2020-04-21
Payer: COMMERCIAL

## 2022-01-10 ENCOUNTER — HOSPITAL ENCOUNTER (EMERGENCY)
Age: 52
Discharge: LEFT BEFORE DISCHARGE | End: 2022-01-10

## 2022-01-10 VITALS
SYSTOLIC BLOOD PRESSURE: 161 MMHG | BODY MASS INDEX: 24.91 KG/M2 | HEIGHT: 66 IN | OXYGEN SATURATION: 97 % | HEART RATE: 107 BPM | DIASTOLIC BLOOD PRESSURE: 88 MMHG | RESPIRATION RATE: 16 BRPM | TEMPERATURE: 98.2 F | WEIGHT: 155 LBS